# Patient Record
Sex: FEMALE | Race: WHITE | Employment: FULL TIME | URBAN - METROPOLITAN AREA
[De-identification: names, ages, dates, MRNs, and addresses within clinical notes are randomized per-mention and may not be internally consistent; named-entity substitution may affect disease eponyms.]

---

## 2021-05-26 ENCOUNTER — RECORDS - HEALTHEAST (OUTPATIENT)
Dept: ADMINISTRATIVE | Facility: CLINIC | Age: 61
End: 2021-05-26

## 2021-05-27 ENCOUNTER — RECORDS - HEALTHEAST (OUTPATIENT)
Dept: ADMINISTRATIVE | Facility: CLINIC | Age: 61
End: 2021-05-27

## 2021-05-31 ENCOUNTER — RECORDS - HEALTHEAST (OUTPATIENT)
Dept: ADMINISTRATIVE | Facility: CLINIC | Age: 61
End: 2021-05-31

## 2021-06-02 ENCOUNTER — RECORDS - HEALTHEAST (OUTPATIENT)
Dept: ADMINISTRATIVE | Facility: CLINIC | Age: 61
End: 2021-06-02

## 2021-06-17 ENCOUNTER — OFFICE VISIT - HEALTHEAST (OUTPATIENT)
Dept: FAMILY MEDICINE | Facility: CLINIC | Age: 61
End: 2021-06-17

## 2021-06-17 DIAGNOSIS — F41.9 ANXIETY: ICD-10-CM

## 2021-06-17 DIAGNOSIS — Z12.11 SCREEN FOR COLON CANCER: ICD-10-CM

## 2021-06-17 DIAGNOSIS — Z00.00 ROUTINE GENERAL MEDICAL EXAMINATION AT A HEALTH CARE FACILITY: ICD-10-CM

## 2021-06-17 DIAGNOSIS — R25.2 CRAMPING OF FEET: ICD-10-CM

## 2021-06-17 DIAGNOSIS — F51.04 PSYCHOPHYSIOLOGICAL INSOMNIA: ICD-10-CM

## 2021-06-17 DIAGNOSIS — Z12.4 SCREENING FOR MALIGNANT NEOPLASM OF CERVIX: ICD-10-CM

## 2021-06-17 DIAGNOSIS — Z12.31 ENCOUNTER FOR SCREENING MAMMOGRAM FOR BREAST CANCER: ICD-10-CM

## 2021-06-17 DIAGNOSIS — Z72.0 TOBACCO USE: ICD-10-CM

## 2021-06-17 DIAGNOSIS — Z13.220 LIPID SCREENING: ICD-10-CM

## 2021-06-17 DIAGNOSIS — Z11.59 ENCOUNTER FOR HEPATITIS C SCREENING TEST FOR LOW RISK PATIENT: ICD-10-CM

## 2021-06-17 LAB
ALBUMIN SERPL-MCNC: 4.2 G/DL (ref 3.5–5)
ALP SERPL-CCNC: 80 U/L (ref 45–120)
ALT SERPL W P-5'-P-CCNC: 11 U/L (ref 0–45)
ANION GAP SERPL CALCULATED.3IONS-SCNC: 12 MMOL/L (ref 5–18)
AST SERPL W P-5'-P-CCNC: 15 U/L (ref 0–40)
BILIRUB SERPL-MCNC: 0.4 MG/DL (ref 0–1)
BUN SERPL-MCNC: 13 MG/DL (ref 8–22)
CALCIUM SERPL-MCNC: 8.8 MG/DL (ref 8.5–10.5)
CHLORIDE BLD-SCNC: 105 MMOL/L (ref 98–107)
CHOLEST SERPL-MCNC: 238 MG/DL
CO2 SERPL-SCNC: 24 MMOL/L (ref 22–31)
CREAT SERPL-MCNC: 0.75 MG/DL (ref 0.6–1.1)
ERYTHROCYTE [DISTWIDTH] IN BLOOD BY AUTOMATED COUNT: 12.5 % (ref 11–14.5)
FASTING STATUS PATIENT QL REPORTED: YES
GFR SERPL CREATININE-BSD FRML MDRD: >60 ML/MIN/1.73M2
GLUCOSE BLD-MCNC: 90 MG/DL (ref 70–125)
HCT VFR BLD AUTO: 42.2 % (ref 35–47)
HDLC SERPL-MCNC: 72 MG/DL
HGB BLD-MCNC: 13.8 G/DL (ref 12–16)
LDLC SERPL CALC-MCNC: 149 MG/DL
MAGNESIUM SERPL-MCNC: 2 MG/DL (ref 1.8–2.6)
MCH RBC QN AUTO: 30.1 PG (ref 27–34)
MCHC RBC AUTO-ENTMCNC: 32.7 G/DL (ref 32–36)
MCV RBC AUTO: 92 FL (ref 80–100)
PLATELET # BLD AUTO: 218 THOU/UL (ref 140–440)
PMV BLD AUTO: 8.7 FL (ref 7–10)
POTASSIUM BLD-SCNC: 4.3 MMOL/L (ref 3.5–5)
PROT SERPL-MCNC: 7.2 G/DL (ref 6–8)
RBC # BLD AUTO: 4.58 MILL/UL (ref 3.8–5.4)
SODIUM SERPL-SCNC: 141 MMOL/L (ref 136–145)
TRIGL SERPL-MCNC: 86 MG/DL
WBC: 5 THOU/UL (ref 4–11)

## 2021-06-17 RX ORDER — HYDROXYZINE HYDROCHLORIDE 25 MG/1
25-50 TABLET, FILM COATED ORAL
Qty: 60 TABLET | Refills: 0 | Status: SHIPPED | OUTPATIENT
Start: 2021-06-17 | End: 2021-09-22

## 2021-06-17 ASSESSMENT — ANXIETY QUESTIONNAIRES
6. BECOMING EASILY ANNOYED OR IRRITABLE: NOT AT ALL
IF YOU CHECKED OFF ANY PROBLEMS ON THIS QUESTIONNAIRE, HOW DIFFICULT HAVE THESE PROBLEMS MADE IT FOR YOU TO DO YOUR WORK, TAKE CARE OF THINGS AT HOME, OR GET ALONG WITH OTHER PEOPLE: NOT DIFFICULT AT ALL
3. WORRYING TOO MUCH ABOUT DIFFERENT THINGS: NOT AT ALL
1. FEELING NERVOUS, ANXIOUS, OR ON EDGE: SEVERAL DAYS
5. BEING SO RESTLESS THAT IT IS HARD TO SIT STILL: NOT AT ALL
4. TROUBLE RELAXING: SEVERAL DAYS
GAD7 TOTAL SCORE: 2
2. NOT BEING ABLE TO STOP OR CONTROL WORRYING: NOT AT ALL
7. FEELING AFRAID AS IF SOMETHING AWFUL MIGHT HAPPEN: NOT AT ALL

## 2021-06-17 ASSESSMENT — PATIENT HEALTH QUESTIONNAIRE - PHQ9: SUM OF ALL RESPONSES TO PHQ QUESTIONS 1-9: 2

## 2021-06-17 ASSESSMENT — MIFFLIN-ST. JEOR: SCORE: 1220.22

## 2021-06-18 LAB
HCV AB SERPL QL IA: NEGATIVE
HPV SOURCE: NORMAL
HUMAN PAPILLOMA VIRUS 16 DNA: NEGATIVE
HUMAN PAPILLOMA VIRUS 18 DNA: NEGATIVE
HUMAN PAPILLOMA VIRUS FINAL DIAGNOSIS: NORMAL
HUMAN PAPILLOMA VIRUS OTHER HR: NEGATIVE
SPECIMEN DESCRIPTION: NORMAL

## 2021-06-21 ENCOUNTER — COMMUNICATION - HEALTHEAST (OUTPATIENT)
Dept: FAMILY MEDICINE | Facility: CLINIC | Age: 61
End: 2021-06-21

## 2021-06-25 ENCOUNTER — COMMUNICATION - HEALTHEAST (OUTPATIENT)
Dept: FAMILY MEDICINE | Facility: CLINIC | Age: 61
End: 2021-06-25

## 2021-06-26 NOTE — PATIENT INSTRUCTIONS - HE
Please call your insurance company to check on Shingrix vaccine coverage (the updated shingles vaccine)                     My Anxiety Action Plan  Name: Julia Garcia   Date of Birth 1960  Date: 6/17/2021    My Doctor: Kj Hemphill MD   My Clinic: [facility]  [address] [facility] [address]  [phone]          GREEN    ZONE   Good Control    What it looks like:     Things are going generally well. You have normal ups and downs. You may even feel depressed from time to time, but bad moods usually last less than a day.   What you need to do:  1. Continue to care for yourself (see self care plan)  2. Check your depression survival kit and update it as needed  3. Follow your physician s recommendations including any medication.  4. Do not stop taking medication unless you consult with your physician first.           YELLOW         ZONE Getting Worse    What it looks like:     Depression is starting to interfere with your life.     It may be hard to get out of bed; you may be starting to isolate yourself from others.    Symptoms of depression are starting to last most all day and this has happened for several days.     You may have suicidal thoughts but they are not constant.   What you need to do:     1. Call your care team. Your response to treatment will improve if you keep your care team informed of your progress. Yellow periods are signs an adjustment may need to be made.     2. Continue your self-care.  Just get dressed and ready for the day.  Don't give yourself time to talk yourself out of it.    3. Talk to someone in your support network.    4. Open up your depression Depression Self-Care Plan / Wellness kit.           RED    ZONE Medical Alert - Get Help    What it looks like:     Depression is seriously interfering with your life.     You may experience these or other symptoms: You can t get out of bed most days, can t work or engage in other  necessary activities, you have trouble taking care of basic hygiene, or basic responsibilities, thoughts of suicide or death that will not go away, self-injurious behavior.     What you need to do:  1. Call your care team and request a same-day appointment. If they are not available (weekends or after hours) call your local crisis line, emergency room or 911.          Depression Self-Care Plan / Wellness Kit    Many people find that medication and therapy are helpful treatments for managing depression. In addition, making small changes to your everyday life can help to boost your mood and improve your wellbeing. Below are some tips for you to consider. Be sure to talk with your medical provider and/or behavioral health consultant if your symptoms are worsening or not improving.     Sleep   Sleep hygiene  means all of the habits that support good, restful sleep. It includes maintaining a consistent bedtime and wake time, using your bedroom only for sleeping or sex, and keeping the bedroom dark and free of distractions like a computer, smartphone, or television.     Develop a Healthy Routine  Maintain good hygiene. Get out of bed in the morning, make your bed, brush your teeth, take a shower, and get dressed. Don t spend too much time viewing media that makes you feel stressed. Find time to relax each day.    Exercise  Get some form of exercise every day. This will help reduce pain and release endorphins, the  feel good  chemicals in your brain. It can be as simple as just going for a walk or doing some gardening, anything that will get you moving.      Diet  Strive to eat healthy foods, including fruits and vegetables. Drink plenty of water. Avoid excessive sugar, caffeine, alcohol, and other mood-altering substances.     Stay Connected with Others  Stay in touch with friends and family members.    Manage Your Mood  Try deep breathing, massage therapy, biofeedback, or meditation. Take part in fun activities when you  can. Try to find something to smile about each day.     Psychotherapy  Be open to working with a therapist if your provider recommends it.     Medication  Be sure to take your medication as prescribed. Most anti-depressants need to be taken every day. It usually takes several weeks for medications to work. Not all medicines work for all people. It is important to follow-up with your provider to make sure you have a treatment plan that is working for you. Do not stop your medication abruptly without first discussing it with your provider.    Crisis Resources   These hotlines are for both adults and children. They and are open 24 hours a day, 7 days a week unless noted otherwise.      National Suicide Prevention Lifeline   8-495-972-TALK (6131)      Crisis Text Line    www.crisistextline.org  Text HOME to 293019 from anywhere in the [place], anytime, about any type of crisis. A live, trained crisis counselor will receive the text and respond quickly.      [facility] Lifeline for LGBTQ Youth  A national crisis intervention and suicide lifeline for LGBTQ youth under 25. Provides a safe place to talk without judgement. Call 1-422.536.4134; text START to 963850 or visit www.the"[employer]vorproject.org to talk to a trained counselor.      For [facility] crisis numbers, visit the [facility] website at:  https://[place].gov/dhs/people-we-serve/adults/health-care/mental-health/resources/crisis-contacts.jsp

## 2021-06-26 NOTE — PROGRESS NOTES
Assessment/ Plan     1. Routine general medical examination at a health care facility  Reviewed and updated patient's past medical, surgical, family, social history, along with current medications and allergies.  Reviewed general healthy living lifestyle guidelines.  Updated patient's health maintenance as further outlined below.  - HM2(CBC w/o Differential)  - Comprehensive Metabolic Panel    2. Tobacco use  Picked up smoking approximately 2 years ago, current use about half a pack per day.  Reiterated the importance of smoking cessation, particularly due to her mother's history of lung cancer.  She declines smoking cessation resources today but is agreeable to the pneumonia vaccine.  - Pneumococcal polysaccharide vaccine 23-valent 3 yo or older, subq/IM    3. Lipid screening  - Lipid [place] FASTING    4. Encounter for hepatitis C screening test for low risk patient  - Hepatitis C Antibody (Anti-HCV)    5. Screening for malignant neoplasm of cervix  - Gynecologic Cytology (PAP Smear)    6. Encounter for screening mammogram for breast cancer  - Mammo Screening Bilateral; Future    7. Screen for colon cancer  - Ambulatory referral for Colonoscopy    8. Anxiety  9. Psychophysiological insomnia  Worsening situational anxiety and difficulty sleeping at night, with low PHQ-9 and DAVID-7 scores of 2.  She declined need for psychotherapy and is unsure if she wants to begin a daily medication.  Will begin with hydroxyzine as needed for sleep and during high stress situations.  - hydrOXYzine HCL (ATARAX) 25 MG tablet; Take 1-2 tablets (25-50 mg total) by mouth at bedtime as needed.  Dispense: 60 tablet; Refill: 0    10. Cramping of feet  Recent episodes of nocturnal foot cramps.  Will check electrolytes today.   - Magnesium    Laurita Chambers,     Subjective:     Julia Garcia is a 60 y.o. female who presents for an annual exam.      Other concerns addressed:  Increased situational anxiety, particularly since her 2  children have moved out of the house.  She now lives alone.  Last week she went to a wedding and had a lot of stress regarding going.  She did not particularly enjoy herself and would not a lot of her sons were there.  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  She also has trouble sleeping at night and has been taking Advil PM.  She denies SI/HI.  She does not want to partake in psychotherapy.     She also had a few nights of foot cramps that wake her up from sleep.  She is taking a multivitamin.     Healthy Habits:   Healthy Diet: Yes  Regular Exercise: No  Seat Belt: Yes  Dental Visits Regularly: has dentures  ------------------  Last Colonoscopy (50-76yo): no prior  Last Dexa (64 yo+): n/a  Prevention of Osteoporosis (Ca 1200 mg/d, Vit D 1000 IU): Yes  Last annual blood work and any abnormalities?: low potassium 2016  Low dose CT (30 pack year, 55-76yo q1y): n/a    Gynecologic History  No LMP recorded. Patient has had an ablation. early 2000s  Last Pap (21-66yo): 2013. Results were: normal  Last mammogram (40-76yo): 2013. Results were: normal    Immunization History   Administered Date(s) Administered     COVID-19,PF,Moderna 12/30/2020, 01/27/2021     Pneumo Polysac 23-V 06/17/2021     Td,adult,historic,unspecified 04/24/2007     Tdap 09/23/2013     Immunization status: due today.     Health Maintenance   Topic Date Due     HEPATITIS C SCREENING  Never done     HIV SCREENING  Never done     ADVANCE CARE PLANNING  Never done     COLORECTAL CANCER SCREENING  Never done     LIPID  Never done     ZOSTER VACCINES (1 of 2) Never done     MAMMOGRAM  09/03/2015     PAP SMEAR  09/23/2018     HPV TEST  09/23/2018     INFLUENZA VACCINE RULE BASED (Season Ended) 08/01/2021     PREVENTIVE CARE VISIT  06/17/2022     TD 18+ HE  09/23/2023     Pneumococcal Vaccine: Pediatrics (0 to 5 Years) and At-Risk Patients (6 to 64 Years) (2 of 2) 11/04/2027     TDAP ADULT ONE TIME DOSE   Completed     COVID-19 Vaccine  Completed     HEPATITIS B VACCINES  Aged Out       OB History   No obstetric history on file.       Current Outpatient Medications   Medication Sig Dispense Refill     ibuprofen/diphenhydramine cit (ADVIL PM ORAL) Take by mouth.       multivit,iron,minerals/lutein (CENTRUM SILVER ULTRA WOMEN'S ORAL) Take by mouth.       hydrOXYzine HCL (ATARAX) 25 MG tablet Take 1-2 tablets (25-50 mg total) by mouth at bedtime as needed. 60 tablet 0     No current facility-administered medications for this visit.      No past medical history on file.  No past surgical history on file.  Sulfa (sulfonamide antibiotics)  Family History   Problem Relation Age of Onset     Lung cancer Mother      Hypertension Father      Liver cancer Father      Diabetes Maternal Grandmother      Heart disease Paternal Grandfather      Dialysis Paternal Grandfather      Social History     Socioeconomic History     Marital status: Single     Spouse name: Not on file     Number of children: Not on file     Years of education: Not on file     Highest education level: Not on file   Occupational History     Not on file   Social Needs     Financial resource strain: Not on file     Food insecurity     Worry: Not on file     Inability: Not on file     Transportation needs     Medical: Not on file     Non-medical: Not on file   Tobacco Use     Smoking status: Current Every Day Smoker     Packs/day: 0.50     Years: 2.00     Pack years: 1.00     Types: Cigarettes   Substance and Sexual Activity     Alcohol use: Yes     Drug use: Not Currently     Sexual activity: Not on file   Lifestyle     Physical activity     Days per week: Not on file     Minutes per session: Not on file     Stress: Not on file   Relationships     Social connections     Talks on phone: Not on file     Gets together: Not on file     Attends Denominational service: Not on file     Active member of club or organization: Not on file     Attends meetings of clubs or  "organizations: Not on file     Relationship status: Not on file     Intimate partner violence     Fear of current or ex partner: Not on file     Emotionally abused: Not on file     Physically abused: Not on file     Forced sexual activity: Not on file   Other Topics Concern     Not on file   Social History Narrative    Works as LPN. Has two boys.        Review of Systems  12 point ROS positive for what is indicated in HPI, otherwise negative.      Objective:      Physical Exam:  /82   Pulse 90   Temp 98.7  F (37.1  C) (Oral)   Resp 12   Ht 5' 3.5\" (1.613 m)   Wt 146 lb 3.2 oz (66.3 kg)   SpO2 98%   BMI 25.49 kg/m      General appearance: Alert, cooperative, no distress, appears stated age  Head: Normocephalic, atraumatic, without obvious abnormality  EARS: Tm's gray dull with structures seen bilaterally  Eyes: Pupils equal round, reactive.  Conjunctiva clear.  Nose: Nares normal, no drainage.  Throat: Lips, mucosa, tongue normal mucosa pink and moist  Neck: Supple, symmetric, trachea midline, no adenopathy.  No thyroid enlargement, tenderness or nodules.    Back: Symmetric  Lungs: Clear to auscultation bilaterally, no wheezing or crackles present.  Respirations unlabored  Heart: Regular rate and rhythm, normal S1 and S2, no murmur, rub or gallop.  Abdomen: Soft, nontender, nondistended. No masses or organomegaly.  Extremities: Extremities normal, atraumatic.  No cyanosis or edema.  Skin: Skin color, texture, turgor normal no rashes or lesions on limited skin exam  Neurologic: CN II through XII intact, normal strength.  GYN: Vulvar atrophy. Vagina with normal rugae, no notable discharge. Cervix is normal in appearance without lesions or discharge noted, no cervical motion tenderness.   Psych: mood neutral and affect appropriate    Results for orders placed or performed during the hospital encounter of 11/21/16   Urine culture    Specimen: Urine   Result Value Ref Range    Culture       Mixture of " organism types with no predominating organism.   HM2(CBC w/o Differential)   Result Value Ref Range    WBC 8.3 4.0 - 11.0 thou/uL    RBC 4.22 3.80 - 5.40 mill/uL    Hemoglobin 13.0 12.0 - 16.0 g/dL    Hematocrit 38.5 35.0 - 47.0 %    MCV 91 80 - 100 fL    MCH 30.7 27.0 - 34.0 pg    MCHC 33.6 32.0 - 36.0 g/dL    RDW 13.2 11.0 - 14.5 %    Platelets 205 140 - 440 thou/uL    MPV 7.6 7.0 - 10.0 fL   Basic Metabolic Panel   Result Value Ref Range    Sodium 141 136 - 145 mmol/L    Potassium 3.4 (L) 3.5 - 5.0 mmol/L    Chloride 109 (H) 98 - 107 mmol/L    CO2 24 22 - 31 mmol/L    Anion Gap, Calculation 8 5 - 18 mmol/L    Glucose 114 70 - 125 mg/dL    Calcium 8.5 8.5 - 10.5 mg/dL    BUN 19 8 - 22 mg/dL    Creatinine 0.79 0.60 - 1.10 mg/dL    GFR MDRD Af Amer >60 >60 mL/min/1.73m2    GFR MDRD Non Af Amer >60 >60 mL/min/1.73m2   Urinalysis   Result Value Ref Range    Color, UA Yellow Colorless, Yellow, Straw, Light Yellow    Clarity, UA Turbid (!) Clear    Glucose, UA Negative Negative    Bilirubin, UA Negative Negative    Ketones, UA Negative Negative, 60 mg/dL    Specific Gravity, UA 1.010 1.001 - 1.030    Blood, UA Negative Negative    pH, UA 7.0 4.5 - 8.0    Protein, UA Trace (!) Negative mg/dL    Urobilinogen, UA 4.0 E.U./dL (!) <2.0 E.U./dL, 2.0 E.U./dL    Nitrite, UA Negative Negative    Leukocytes, UA Moderate (!) Negative    Bacteria, UA Moderate (!) None Seen hpf    RBC, UA 0-2 None Seen, 0-2 hpf    WBC, UA 10-25 (!) None Seen, 0-5 hpf    Squam Epithel, UA 0-5 None Seen, 0-5 lpf    WBC Clumps Present (!) None Seen    Amorphous, UA Moderate (!) None Seen    Mucus, UA Moderate (!) None Seen lpf    Granular Casts, UA 5-10 (!) None Seen lpf    Hyaline Casts, UA 10-25 (!) 0-5, None Seen lpf   ECG 12 lead nursing unit performed   Result Value Ref Range    SYSTOLIC BLOOD PRESSURE  mmHg    DIASTOLIC BLOOD PRESSURE  mmHg    VENTRICULAR RATE 80 BPM    ATRIAL RATE 80 BPM    P-R INTERVAL 160 ms    QRS DURATION 86 ms    Q-T  INTERVAL 380 ms    QTC CALCULATION (BEZET) 438 ms    P Axis 71 degrees    R AXIS 43 degrees    T AXIS 48 degrees    MUSE DIAGNOSIS       Normal sinus rhythm  Normal ECG  When compared with ECG of 30-MAR-2010 08:46,  No significant change was found  Confirmed by BRIDGETTE GIBBS MD LOC:JN ([id number]) on 11/21/2016 1:46:12 PM         Laurita Chambers DO

## 2021-07-04 NOTE — LETTER
Letter by Laurita Chambers DO at      Author: Laurita Chambers DO Service: -- Author Type: --    Filed:  Encounter Date: 6/21/2021 Status: (Other)         Julia Jacobonathananne  [address] [facility] [address]             June 21, 2021         Dear Ms. Petitthaiscatherinerogers,    Below are the results from your recent visit:    Resulted Orders   Lipid [place] FASTING   Result Value Ref Range    Cholesterol 238 (H) <=199 mg/dL    Triglycerides 86 <=149 mg/dL    HDL Cholesterol 72 >=50 mg/dL    LDL Calculated 149 (H) <=129 mg/dL    Patient Fasting > 8hrs? Yes    HM2(CBC w/o Differential)   Result Value Ref Range    WBC 5.0 4.0 - 11.0 thou/uL    RBC 4.58 3.80 - 5.40 mill/uL    Hemoglobin 13.8 12.0 - 16.0 g/dL    Hematocrit 42.2 35.0 - 47.0 %    MCV 92 80 - 100 fL    MCH 30.1 27.0 - 34.0 pg    MCHC 32.7 32.0 - 36.0 g/dL    RDW 12.5 11.0 - 14.5 %    Platelets 218 140 - 440 thou/uL    MPV 8.7 7.0 - 10.0 fL   Hepatitis C Antibody (Anti-HCV)   Result Value Ref Range    Hepatitis C Ab Negative Negative   Magnesium   Result Value Ref Range    Magnesium 2.0 1.8 - 2.6 mg/dL   Comprehensive Metabolic Panel   Result Value Ref Range    Sodium 141 136 - 145 mmol/L    Potassium 4.3 3.5 - 5.0 mmol/L    Chloride 105 98 - 107 mmol/L    CO2 24 22 - 31 mmol/L    Anion Gap, Calculation 12 5 - 18 mmol/L    Glucose 90 70 - 125 mg/dL    BUN 13 8 - 22 mg/dL    Creatinine 0.75 0.60 - 1.10 mg/dL    GFR MDRD Af Amer >60 >60 mL/min/1.73m2    GFR MDRD Non Af Amer >60 >60 mL/min/1.73m2    Bilirubin, Total 0.4 0.0 - 1.0 mg/dL    Calcium 8.8 8.5 - 10.5 mg/dL    Protein, Total 7.2 6.0 - 8.0 g/dL    Albumin 4.2 3.5 - 5.0 g/dL    Alkaline Phosphatase 80 45 - 120 U/L    AST 15 0 - 40 U/L    ALT 11 0 - 45 U/L    Narrative    Fasting Glucose reference range is 70-99 mg/dL per  American Diabetes Association (ADA) guidelines.       1) your blood counts, kidney and liver function, and electrolytes are normal. I do not see an obvious reason for your foot  cramps based on your blood work. Would recommend stretching and a warm shower prior to bed to loosen the muscles.   2) your hepatitis C screen was negative   3) your cholesterol levels are elevated. You were just near the threshold for recommending a medication to lower these values. Please work on a healthy diet and exercise. We will reassess this in 1 year.     Laurita Chambers, DO     Please call with questions or contact us using [facility].    Sincerely,        Electronically signed by Laurita Chambers DO

## 2021-07-04 NOTE — LETTER
Letter by Antonia Sutton RN at      Author: Antonia Sutton RN Service: -- Author Type: --    Filed:  Encounter Date: 6/25/2021 Status: (Other)         Julia Garcia  [address] [facility] [address]             June 25, 2021         Dear Ms. Garcia,    We are happy to inform you that your recent Pap smear and Human Papillomavirus (HPV) test results are normal and negative.    It is recommended that you have your next Pap smear and Human Papillomavirus (HPV) test in 5 years. You will also need to return to the clinic every year for an annual wellness visit.    If you have additional questions regarding this result, please contact our office and we will be happy to assist you.      Sincerely,    Your [facility] Care Team

## 2021-07-05 PROBLEM — F41.9 ANXIETY: Status: ACTIVE | Noted: 2021-06-17

## 2021-07-06 VITALS
OXYGEN SATURATION: 98 % | WEIGHT: 146.2 LBS | RESPIRATION RATE: 12 BRPM | TEMPERATURE: 98.7 F | HEART RATE: 90 BPM | SYSTOLIC BLOOD PRESSURE: 135 MMHG | HEIGHT: 64 IN | DIASTOLIC BLOOD PRESSURE: 82 MMHG | BODY MASS INDEX: 24.96 KG/M2

## 2021-07-06 ASSESSMENT — PATIENT HEALTH QUESTIONNAIRE - PHQ9: SUM OF ALL RESPONSES TO PHQ QUESTIONS 1-9: 2

## 2021-07-08 ASSESSMENT — ANXIETY QUESTIONNAIRES: GAD7 TOTAL SCORE: 2

## 2021-07-13 ENCOUNTER — RECORDS - HEALTHEAST (OUTPATIENT)
Dept: ADMINISTRATIVE | Facility: CLINIC | Age: 61
End: 2021-07-13

## 2021-07-21 ENCOUNTER — RECORDS - HEALTHEAST (OUTPATIENT)
Dept: ADMINISTRATIVE | Facility: CLINIC | Age: 61
End: 2021-07-21

## 2021-09-21 DIAGNOSIS — F51.04 PSYCHOPHYSIOLOGICAL INSOMNIA: ICD-10-CM

## 2021-09-21 NOTE — TELEPHONE ENCOUNTER
"Routing refill request to provider for review/approval because:  PCP to review - PRN medication.    Last Written Prescription Date:  6/17/2021  Last Fill Quantity: 60,  # refills: 0   Last office visit provider:  6/17/2021 Dr. Chambers     hydrOXYzine HCL (ATARAX) 25 MG tablet 60 tablet 0 6/17/2021  --   Sig - Route: Take 1-2 tablets (25-50 mg total) by mouth at bedtime as needed. - Oral   Sent to pharmacy as: hydrOXYzine HCL 25 mg tablet (ATARAX)   E-Prescribing Status: Receipt confirmed by pharmacy (6/17/2021 12:31 PM CDT       Requested Prescriptions   Pending Prescriptions Disp Refills     hydrOXYzine (ATARAX) 25 MG tablet [Pharmacy Med Name: HYDROXYZINE HCL 25MG TABS (WHITE)] 60 tablet 0     Sig: TAKE 1 TO 2 TABLETS(25 TO 50 MG) BY MOUTH AT BEDTIME AS NEEDED       Antihistamines Protocol Passed - 9/21/2021  9:15 AM        Passed - Recent (12 mo) or future (30 days) visit within the authorizing provider's specialty     Patient has had an office visit with the authorizing provider or a provider within the authorizing providers department within the previous 12 mos or has a future within next 30 days. See \"Patient Info\" tab in inbasket, or \"Choose Columns\" in Meds & Orders section of the refill encounter.              Passed - Patient is age 3 or older     Apply age and/or weight-based dosing for peds patients age 3 and older.    Forward request to provider for patients under the age of 3.          Passed - Medication is active on med list             Pallavi Palmer RN 09/21/21 3:52 PM  "

## 2021-09-22 RX ORDER — HYDROXYZINE HYDROCHLORIDE 25 MG/1
TABLET, FILM COATED ORAL
Qty: 60 TABLET | Refills: 0 | Status: SHIPPED | OUTPATIENT
Start: 2021-09-22 | End: 2022-04-14

## 2021-11-12 ENCOUNTER — ANCILLARY PROCEDURE (OUTPATIENT)
Dept: MAMMOGRAPHY | Facility: CLINIC | Age: 61
End: 2021-11-12
Attending: FAMILY MEDICINE
Payer: COMMERCIAL

## 2021-11-12 DIAGNOSIS — Z12.31 SCREENING MAMMOGRAM, ENCOUNTER FOR: ICD-10-CM

## 2021-11-12 PROCEDURE — 77067 SCR MAMMO BI INCL CAD: CPT

## 2022-04-14 ENCOUNTER — OFFICE VISIT (OUTPATIENT)
Dept: FAMILY MEDICINE | Facility: CLINIC | Age: 62
End: 2022-04-14
Payer: COMMERCIAL

## 2022-04-14 VITALS
DIASTOLIC BLOOD PRESSURE: 101 MMHG | BODY MASS INDEX: 28.56 KG/M2 | TEMPERATURE: 98.2 F | WEIGHT: 163.8 LBS | OXYGEN SATURATION: 98 % | HEART RATE: 79 BPM | RESPIRATION RATE: 16 BRPM | SYSTOLIC BLOOD PRESSURE: 161 MMHG

## 2022-04-14 DIAGNOSIS — R03.0 ELEVATED BP WITHOUT DIAGNOSIS OF HYPERTENSION: ICD-10-CM

## 2022-04-14 DIAGNOSIS — F51.04 PSYCHOPHYSIOLOGICAL INSOMNIA: ICD-10-CM

## 2022-04-14 DIAGNOSIS — Z12.11 ENCOUNTER FOR COLORECTAL CANCER SCREENING: ICD-10-CM

## 2022-04-14 DIAGNOSIS — Z12.12 ENCOUNTER FOR COLORECTAL CANCER SCREENING: ICD-10-CM

## 2022-04-14 DIAGNOSIS — R39.15 URINARY URGENCY: ICD-10-CM

## 2022-04-14 DIAGNOSIS — R35.0 INCREASED FREQUENCY OF URINATION: ICD-10-CM

## 2022-04-14 LAB
ALBUMIN UR-MCNC: ABNORMAL MG/DL
APPEARANCE UR: CLEAR
BACTERIA #/AREA URNS HPF: ABNORMAL /HPF
BILIRUB UR QL STRIP: ABNORMAL
COLOR UR AUTO: YELLOW
GLUCOSE UR STRIP-MCNC: NEGATIVE MG/DL
HGB UR QL STRIP: ABNORMAL
KETONES UR STRIP-MCNC: ABNORMAL MG/DL
LEUKOCYTE ESTERASE UR QL STRIP: NEGATIVE
NITRATE UR QL: NEGATIVE
PH UR STRIP: 5 [PH] (ref 5–8)
RBC #/AREA URNS AUTO: ABNORMAL /HPF
SP GR UR STRIP: >=1.03 (ref 1–1.03)
SQUAMOUS #/AREA URNS AUTO: ABNORMAL /LPF
UROBILINOGEN UR STRIP-ACNC: 0.2 E.U./DL
WBC #/AREA URNS AUTO: ABNORMAL /HPF

## 2022-04-14 PROCEDURE — 99214 OFFICE O/P EST MOD 30 MIN: CPT | Performed by: FAMILY MEDICINE

## 2022-04-14 PROCEDURE — 81001 URINALYSIS AUTO W/SCOPE: CPT | Performed by: FAMILY MEDICINE

## 2022-04-14 PROCEDURE — 87086 URINE CULTURE/COLONY COUNT: CPT | Performed by: FAMILY MEDICINE

## 2022-04-14 RX ORDER — HYDROXYZINE HYDROCHLORIDE 25 MG/1
TABLET, FILM COATED ORAL
Qty: 60 TABLET | Refills: 0 | Status: SHIPPED | OUTPATIENT
Start: 2022-04-14

## 2022-04-14 ASSESSMENT — ANXIETY QUESTIONNAIRES
5. BEING SO RESTLESS THAT IT IS HARD TO SIT STILL: NOT AT ALL
7. FEELING AFRAID AS IF SOMETHING AWFUL MIGHT HAPPEN: NOT AT ALL
7. FEELING AFRAID AS IF SOMETHING AWFUL MIGHT HAPPEN: NOT AT ALL
3. WORRYING TOO MUCH ABOUT DIFFERENT THINGS: NOT AT ALL
GAD7 TOTAL SCORE: 0
6. BECOMING EASILY ANNOYED OR IRRITABLE: NOT AT ALL
1. FEELING NERVOUS, ANXIOUS, OR ON EDGE: NOT AT ALL
GAD7 TOTAL SCORE: 0
2. NOT BEING ABLE TO STOP OR CONTROL WORRYING: NOT AT ALL
4. TROUBLE RELAXING: NOT AT ALL

## 2022-04-14 NOTE — PROGRESS NOTES
DIAGNOSIS:  Encounter Diagnoses   Name Primary?     Increased frequency of urination, unclear etiology, see Urology if culture negative      Urinary urgency, unclear etiology, see Urology if culture negative      Psychophysiological insomnia,  Hydroxyzine refilled      Elevated BP without diagnosis of hypertension      Encounter for colorectal cancer screening, Cologuard ordered         PLAN:     Recheck home/work BPs and get a nurse BP check in one week    Pending urine culture.  If negative culture then would recommend Urology consultation.    Complete a Cologuard test    [facility] referral     Hydroxyzine aids in sleep and is refilled.        HPI:   onset one month ago of some itching followed by frequency, urgency but no burning.  Twice in the past two weeks had feeling of warmth.  No shaking chills.  Some lower back pain last weekend at work.  No h/o UTI in the the recent past (or probably every).   Fingerstick bs at work today was 94.  Normal pap with neg HPV ON 6-17-22  Colonoscopy was never scheduled.  Hydroxyzine aids in sleep and is refilled.      Current Outpatient Medications   Medication     hydrOXYzine (ATARAX) 25 MG tablet     ibuprofen/diphenhydramine cit (ADVIL PM ORAL)     multivit,iron,minerals/lutein (CENTRUM SILVER ULTRA WOMEN'S ORAL)     No current facility-administered medications for this visit.       Pmh: reviewed  Psh: reviewed  Allergy:  reviewed      EXAM:    BP (!) 161/101   Pulse 79   Temp 98.2  F (36.8  C) (Oral)   Resp 16   Wt 74.3 kg (163 lb 12.8 oz)   SpO2 98%   BMI 28.56 kg/m    GEN:   ALERT, NAD, ORIENTED TIMES THREE  LUNGS: CTA  COR: RRR WITHOUT MURMUR  ABD: SOFT, +BS, NONTX WITHOUT GUARDING OR PERITONEAL SIGNS  Slight suprapubic tx (feeling of fullness)    Last Comprehensive Metabolic Panel:  Sodium   Date Value Ref Range Status   06/17/2021 141 136 - 145 mmol/L Final     Potassium   Date Value Ref Range Status   06/17/2021 4.3 3.5 - 5.0 mmol/L Final     Chloride   Date  Value Ref Range Status   06/17/2021 105 98 - 107 mmol/L Final     Carbon Dioxide (CO2)   Date Value Ref Range Status   06/17/2021 24 22 - 31 mmol/L Final     Anion Gap   Date Value Ref Range Status   06/17/2021 12 5 - 18 mmol/L Final     Glucose   Date Value Ref Range Status   06/17/2021 90 70 - 125 mg/dL Final     Urea Nitrogen   Date Value Ref Range Status   06/17/2021 13 8 - 22 mg/dL Final     Creatinine   Date Value Ref Range Status   06/17/2021 0.75 0.60 - 1.10 mg/dL Final     GFR Estimate   Date Value Ref Range Status   06/17/2021 >60 >60 mL/min/1.73m2 Final     Calcium   Date Value Ref Range Status   06/17/2021 8.8 8.5 - 10.5 mg/dL Final     Bilirubin Total   Date Value Ref Range Status   06/17/2021 0.4 0.0 - 1.0 mg/dL Final     Alkaline Phosphatase   Date Value Ref Range Status   06/17/2021 80 45 - 120 U/L Final     ALT   Date Value Ref Range Status   06/17/2021 11 0 - 45 U/L Final     AST   Date Value Ref Range Status   06/17/2021 15 0 - 40 U/L Final                   Answers for HPI/ROS submitted by the patient on 4/14/2022  DAVID 7 TOTAL SCORE: 0  How many servings of fruits and vegetables do you eat daily?: 0-1  On average, how many sweetened beverages do you drink each day (Examples: soda, juice, sweet tea, etc.  Do NOT count diet or artificially sweetened beverages)?: 1  How many minutes a day do you exercise enough to make your heart beat faster?: 9 or less  How many days a week do you exercise enough to make your heart beat faster?: 3 or less  How many days per week do you miss taking your medication?: 0  What is the reason for your visit today?: Possible uti  When did your symptoms begin?: 3-4 weeks ago  What are your symptoms?: Frequency. Urinary.  Voiding small amts  How would you describe these symptoms?: Severe  Are your symptoms:: Staying the same  Have you had these symptoms before?: No  Is there anything that makes you feel worse?: No

## 2022-04-14 NOTE — PATIENT INSTRUCTIONS
Recheck home/work BPs and get a nurse BP check in one week    Pending urine culture    Complete a Cologuard test    [facility] referral

## 2022-04-14 NOTE — LETTER
April 16, 2022      Julia Garcia  [address]  [facility] [address]        Dear MsAnju,  We are writing to inform you of your test results.    Alexsander Dumont:  Your urine culture was NEGATIVE for infection.  I would like you to follow up with Urology for your ongoing symptoms.    Resulted Orders   UA Macro with Reflex to Micro and Culture - lab collect   Result Value Ref Range    Color Urine Yellow Colorless, Straw, Light Yellow, Yellow    Appearance Urine Clear Clear    Glucose Urine Negative Negative mg/dL    Bilirubin Urine Small (A) Negative    Ketones Urine Trace (A) Negative mg/dL    Specific Gravity Urine >=1.030 1.005 - 1.030    Blood Urine Trace (A) Negative    pH Urine 5.0 5.0 - 8.0    Protein Albumin Urine Trace (A) Negative mg/dL    Urobilinogen Urine 0.2 0.2, 1.0 E.U./dL    Nitrite Urine Negative Negative    Leukocyte Esterase Urine Negative Negative   Urine Culture Aerobic Bacterial - lab collect   Result Value Ref Range    Culture No Growth    Urine Microscopic   Result Value Ref Range    Bacteria Urine None Seen None Seen /HPF    RBC Urine 0-2 0-2 /HPF /HPF    WBC Urine 0-5 0-5 /HPF /HPF    Squamous Epithelials Urine Few (A) None Seen /LPF    Narrative    Urine Culture not indicated       If you have any questions or concerns, please call the clinic at the number listed above.       Sincerely,      Johnathan Sorenson MD

## 2022-04-15 ASSESSMENT — ANXIETY QUESTIONNAIRES: GAD7 TOTAL SCORE: 0

## 2022-04-16 LAB — BACTERIA UR CULT: NO GROWTH

## 2022-04-19 ENCOUNTER — TELEPHONE (OUTPATIENT)
Dept: FAMILY MEDICINE | Facility: CLINIC | Age: 62
End: 2022-04-19
Payer: COMMERCIAL

## 2022-04-19 NOTE — TELEPHONE ENCOUNTER
----- Message from Johnathan Sorenson MD sent at 4/16/2022  5:53 PM CDT -----  Alexsander Dumont:  Your urine culture was NEGATIVE for infection.  I would like you to follow up with Urology for your ongoing symptoms.

## 2022-04-19 NOTE — TELEPHONE ENCOUNTER
Left message to call back for: Results  Information to relay to patient: LMTCB, Please see message below from provider.

## 2022-11-30 ENCOUNTER — ANCILLARY PROCEDURE (OUTPATIENT)
Dept: MAMMOGRAPHY | Facility: HOSPITAL | Age: 62
End: 2022-11-30
Attending: FAMILY MEDICINE
Payer: COMMERCIAL

## 2022-11-30 DIAGNOSIS — Z12.31 VISIT FOR SCREENING MAMMOGRAM: ICD-10-CM

## 2022-11-30 PROCEDURE — 77067 SCR MAMMO BI INCL CAD: CPT

## 2022-12-15 ENCOUNTER — ANCILLARY PROCEDURE (OUTPATIENT)
Dept: MAMMOGRAPHY | Facility: CLINIC | Age: 62
End: 2022-12-15
Attending: FAMILY MEDICINE
Payer: COMMERCIAL

## 2022-12-15 DIAGNOSIS — N64.89 BREAST ASYMMETRY: ICD-10-CM

## 2022-12-15 PROCEDURE — 77061 BREAST TOMOSYNTHESIS UNI: CPT | Mod: RT

## 2024-01-19 ENCOUNTER — ANCILLARY PROCEDURE (OUTPATIENT)
Dept: MAMMOGRAPHY | Facility: HOSPITAL | Age: 64
End: 2024-01-19
Attending: FAMILY MEDICINE
Payer: COMMERCIAL

## 2024-01-19 DIAGNOSIS — Z12.31 VISIT FOR SCREENING MAMMOGRAM: ICD-10-CM

## 2024-01-19 PROCEDURE — 77067 SCR MAMMO BI INCL CAD: CPT

## 2025-02-28 ENCOUNTER — ANCILLARY PROCEDURE (OUTPATIENT)
Dept: MAMMOGRAPHY | Facility: HOSPITAL | Age: 65
End: 2025-02-28
Attending: FAMILY MEDICINE
Payer: COMMERCIAL

## 2025-02-28 DIAGNOSIS — Z12.31 VISIT FOR SCREENING MAMMOGRAM: ICD-10-CM

## 2025-02-28 PROCEDURE — 77063 BREAST TOMOSYNTHESIS BI: CPT

## 2025-02-28 PROCEDURE — 77067 SCR MAMMO BI INCL CAD: CPT

## 2025-04-01 SDOH — HEALTH STABILITY: PHYSICAL HEALTH: ON AVERAGE, HOW MANY MINUTES DO YOU ENGAGE IN EXERCISE AT THIS LEVEL?: 10 MIN

## 2025-04-01 SDOH — HEALTH STABILITY: PHYSICAL HEALTH: ON AVERAGE, HOW MANY DAYS PER WEEK DO YOU ENGAGE IN MODERATE TO STRENUOUS EXERCISE (LIKE A BRISK WALK)?: 1 DAY

## 2025-04-02 ENCOUNTER — OFFICE VISIT (OUTPATIENT)
Dept: FAMILY MEDICINE | Facility: CLINIC | Age: 65
End: 2025-04-02
Payer: COMMERCIAL

## 2025-04-02 VITALS
BODY MASS INDEX: 23.52 KG/M2 | SYSTOLIC BLOOD PRESSURE: 137 MMHG | RESPIRATION RATE: 16 BRPM | DIASTOLIC BLOOD PRESSURE: 86 MMHG | HEIGHT: 62 IN | OXYGEN SATURATION: 97 % | TEMPERATURE: 98.7 F | HEART RATE: 69 BPM | WEIGHT: 127.8 LBS

## 2025-04-02 DIAGNOSIS — Z12.11 SCREEN FOR COLON CANCER: ICD-10-CM

## 2025-04-02 DIAGNOSIS — F41.9 ANXIETY: ICD-10-CM

## 2025-04-02 DIAGNOSIS — R25.2 NOCTURNAL FOOT CRAMPS: ICD-10-CM

## 2025-04-02 DIAGNOSIS — E78.2 MIXED HYPERLIPIDEMIA: ICD-10-CM

## 2025-04-02 DIAGNOSIS — F51.04 PSYCHOPHYSIOLOGICAL INSOMNIA: ICD-10-CM

## 2025-04-02 DIAGNOSIS — Z87.891 HISTORY OF TOBACCO USE, PRESENTING HAZARDS TO HEALTH: ICD-10-CM

## 2025-04-02 DIAGNOSIS — Z00.00 ROUTINE GENERAL MEDICAL EXAMINATION AT A HEALTH CARE FACILITY: Primary | ICD-10-CM

## 2025-04-02 LAB
ALBUMIN SERPL BCG-MCNC: 4.3 G/DL (ref 3.5–5.2)
ALP SERPL-CCNC: 77 U/L (ref 40–150)
ALT SERPL W P-5'-P-CCNC: 13 U/L (ref 0–50)
ANION GAP SERPL CALCULATED.3IONS-SCNC: 10 MMOL/L (ref 7–15)
AST SERPL W P-5'-P-CCNC: 21 U/L (ref 0–45)
BILIRUB SERPL-MCNC: 0.2 MG/DL
BUN SERPL-MCNC: 17.8 MG/DL (ref 8–23)
CALCIUM SERPL-MCNC: 9.1 MG/DL (ref 8.8–10.4)
CHLORIDE SERPL-SCNC: 100 MMOL/L (ref 98–107)
CHOLEST SERPL-MCNC: 180 MG/DL
CREAT SERPL-MCNC: 0.73 MG/DL (ref 0.51–0.95)
EGFRCR SERPLBLD CKD-EPI 2021: >90 ML/MIN/1.73M2
ERYTHROCYTE [DISTWIDTH] IN BLOOD BY AUTOMATED COUNT: 12.3 % (ref 10–15)
FASTING STATUS PATIENT QL REPORTED: NO
FASTING STATUS PATIENT QL REPORTED: NO
FERRITIN SERPL-MCNC: 54 NG/ML (ref 11–328)
GLUCOSE SERPL-MCNC: 85 MG/DL (ref 70–99)
HCO3 SERPL-SCNC: 28 MMOL/L (ref 22–29)
HCT VFR BLD AUTO: 39 % (ref 35–47)
HDLC SERPL-MCNC: 67 MG/DL
HGB BLD-MCNC: 12.9 G/DL (ref 11.7–15.7)
LDLC SERPL CALC-MCNC: 96 MG/DL
MAGNESIUM SERPL-MCNC: 2.2 MG/DL (ref 1.7–2.3)
MCH RBC QN AUTO: 30.9 PG (ref 26.5–33)
MCHC RBC AUTO-ENTMCNC: 33.1 G/DL (ref 31.5–36.5)
MCV RBC AUTO: 94 FL (ref 78–100)
NONHDLC SERPL-MCNC: 113 MG/DL
PLATELET # BLD AUTO: 223 10E3/UL (ref 150–450)
POTASSIUM SERPL-SCNC: 4.6 MMOL/L (ref 3.4–5.3)
PROT SERPL-MCNC: 7.1 G/DL (ref 6.4–8.3)
RBC # BLD AUTO: 4.17 10E6/UL (ref 3.8–5.2)
SODIUM SERPL-SCNC: 138 MMOL/L (ref 135–145)
TRIGL SERPL-MCNC: 84 MG/DL
WBC # BLD AUTO: 6.8 10E3/UL (ref 4–11)

## 2025-04-02 RX ORDER — ESCITALOPRAM OXALATE 10 MG/1
10 TABLET ORAL DAILY
Qty: 30 TABLET | Refills: 1 | Status: SHIPPED | OUTPATIENT
Start: 2025-04-02

## 2025-04-02 RX ORDER — TRAZODONE HYDROCHLORIDE 50 MG/1
25-50 TABLET ORAL
Qty: 90 TABLET | Refills: 1 | Status: SHIPPED | OUTPATIENT
Start: 2025-04-02

## 2025-04-02 ASSESSMENT — PATIENT HEALTH QUESTIONNAIRE - PHQ9
10. IF YOU CHECKED OFF ANY PROBLEMS, HOW DIFFICULT HAVE THESE PROBLEMS MADE IT FOR YOU TO DO YOUR WORK, TAKE CARE OF THINGS AT HOME, OR GET ALONG WITH OTHER PEOPLE: NOT DIFFICULT AT ALL
SUM OF ALL RESPONSES TO PHQ QUESTIONS 1-9: 6
SUM OF ALL RESPONSES TO PHQ QUESTIONS 1-9: 6

## 2025-04-02 NOTE — PATIENT INSTRUCTIONS
Patient Education   Preventive Care Advice   This is general advice given by our system to help you stay healthy. However, your care team may have specific advice just for you. Please talk to your care team about your preventive care needs.  Nutrition  Eat 5 or more servings of fruits and vegetables each day.  Try wheat bread, brown rice and whole grain pasta (instead of white bread, rice, and pasta).  Get enough calcium and vitamin D. Check the label on foods and aim for 100% of the RDA (recommended daily allowance).  Lifestyle  Exercise at least 150 minutes each week  (30 minutes a day, 5 days a week).  Do muscle strengthening activities 2 days a week. These help control your weight and prevent disease.  No smoking.  Wear sunscreen to prevent skin cancer.  Have a dental exam and cleaning every 6 months.  Yearly exams  See your health care team every year to talk about:  Any changes in your health.  Any medicines your care team has prescribed.  Preventive care, family planning, and ways to prevent chronic diseases.  Shots (vaccines)   HPV shots (up to age 26), if you've never had them before.  Hepatitis B shots (up to age 59), if you've never had them before.  COVID-19 shot: Get this shot when it's due.  Flu shot: Get a flu shot every year.  Tetanus shot: Get a tetanus shot every 10 years.  Pneumococcal, hepatitis A, and RSV shots: Ask your care team if you need these based on your risk.  Shingles shot (for age 50 and up)  General health tests  Diabetes screening:  Starting at age 35, Get screened for diabetes at least every 3 years.  If you are younger than age 35, ask your care team if you should be screened for diabetes.  Cholesterol test: At age 39, start having a cholesterol test every 5 years, or more often if advised.  Bone density scan (DEXA): At age 50, ask your care team if you should have this scan for osteoporosis (brittle bones).  Hepatitis C: Get tested at least once in your life.  STIs (sexually  transmitted infections)  Before age 24: Ask your care team if you should be screened for STIs.  After age 24: Get screened for STIs if you're at risk. You are at risk for STIs (including HIV) if:  You are sexually active with more than one person.  You don't use condoms every time.  You or a partner was diagnosed with a sexually transmitted infection.  If you are at risk for HIV, ask about PrEP medicine to prevent HIV.  Get tested for HIV at least once in your life, whether you are at risk for HIV or not.  Cancer screening tests  Cervical cancer screening: If you have a cervix, begin getting regular cervical cancer screening tests starting at age 21.  Breast cancer scan (mammogram): If you've ever had breasts, begin having regular mammograms starting at age 40. This is a scan to check for breast cancer.  Colon cancer screening: It is important to start screening for colon cancer at age 45.  Have a colonoscopy test every 10 years (or more often if you're at risk) Or, ask your provider about stool tests like a FIT test every year or Cologuard test every 3 years.  To learn more about your testing options, visit:   .  For help making a decision, visit:   https://bit.ly/ji80936.  Prostate cancer screening test: If you have a prostate, ask your care team if a prostate cancer screening test (PSA) at age 55 is right for you.  Lung cancer screening: If you are a current or former smoker ages 50 to 80, ask your care team if ongoing lung cancer screenings are right for you.  For informational purposes only. Not to replace the advice of your health care provider. Copyright   2023 [facility]. All rights reserved. Clinically reviewed by the [facility] Program. [employer] [id number] - REV 01/24.  Learning About Stress  What is stress?     Stress is your body's response to a hard situation. Your body can have a physical, emotional, or mental response. Stress is a fact of life for most people, and it  affects everyone differently. What causes stress for you may not be stressful for someone else.  A lot of things can cause stress. You may feel stress when you go on a job interview, take a test, or run a race. This kind of short-term stress is normal and even useful. It can help you if you need to work hard or react quickly. For example, stress can help you finish an important job on time.  Long-term stress is caused by ongoing stressful situations or events. Examples of long-term stress include long-term health problems, ongoing problems at work, or conflicts in your family. Long-term stress can harm your health.  How does stress affect your health?  When you are stressed, your body responds as though you are in danger. It makes hormones that speed up your heart, make you breathe faster, and give you a burst of energy. This is called the fight-or-flight stress response. If the stress is over quickly, your body goes back to normal and no harm is done.  But if stress happens too often or lasts too long, it can have bad effects. Long-term stress can make you more likely to get sick, and it can make symptoms of some diseases worse. If you tense up when you are stressed, you may develop neck, shoulder, or low back pain. Stress is linked to high blood pressure and heart disease.  Stress also harms your emotional health. It can make you orozco, tense, or depressed. Your relationships may suffer, and you may not do well at work or school.  What can you do to manage stress?  You can try these things to help manage stress:   Do something active. Exercise or activity can help reduce stress. Walking is a great way to get started. Even everyday activities such as housecleaning or yard work can help.  Try yoga or sirnea chi. These techniques combine exercise and meditation. You may need some training at first to learn them.  Do something you enjoy. For example, listen to music or go to a movie. Practice your hobby or do volunteer  "work.  Meditate. This can help you relax, because you are not worrying about what happened before or what may happen in the future.  Do guided imagery. Imagine yourself in any setting that helps you feel calm. You can use online videos, books, or a teacher to guide you.  Do breathing exercises. For example:  From a standing position, bend forward from the waist with your knees slightly bent. Let your arms dangle close to the floor.  Breathe in slowly and deeply as you return to a standing position. Roll up slowly and lift your head last.  Hold your breath for just a few seconds in the standing position.  Breathe out slowly and bend forward from the waist.  Let your feelings out. Talk, laugh, cry, and express anger when you need to. Talking with supportive friends or family, a counselor, or a myles leader about your feelings is a healthy way to relieve stress. Avoid discussing your feelings with people who make you feel worse.  Write. It may help to write about things that are bothering you. This helps you find out how much stress you feel and what is causing it. When you know this, you can find better ways to cope.  What can you do to prevent stress?  You might try some of these things to help prevent stress:  Manage your time. This helps you find time to do the things you want and need to do.  Get enough sleep. Your body recovers from the stresses of the day while you are sleeping.  Get support. Your family, friends, and community can make a difference in how you experience stress.  Limit your news feed. Avoid or limit time on social media or news that may make you feel stressed.  Do something active. Exercise or activity can help reduce stress. Walking is a great way to get started.  Where can you learn more?  Go to https://www.[employer].net/patiented  Enter N032 in the search box to learn more about \"Learning About Stress.\"  Current as of: October 24, 2024  Content Version: 14.4 2024-2025 [employer].   Care instructions adapted under license by your healthcare professional. If you have questions about a medical condition or this instruction, always ask your healthcare professional. [employer] disclaims any warranty or liability for your use of this information.    Learning About Depression Screening  What is depression screening?  Depression screening is a way to see if you have depression symptoms. It may be done by a doctor or counselor. It's often part of a routine checkup. That's because your mental health is just as important as your physical health.  Depression is a mental health condition that affects how you feel, think, and act. You may:  Have less energy.  Lose interest in your daily activities.  Feel sad and grouchy for a long time.  Depression is very common. It affects people of all ages.  Many things can lead to depression. Some people become depressed after they have a stroke or find out they have a major illness like cancer or heart disease. The death of a loved one or a breakup may lead to depression. It can run in families. Most experts believe that a combination of inherited genes and stressful life events can cause it.  What happens during screening?  You may be asked to fill out a form about your depression symptoms. You and the doctor will discuss your answers. The doctor may ask you more questions to learn more about how you think, act, and feel.  What happens after screening?  If you have symptoms of depression, your doctor will talk to you about your options.  Doctors usually treat depression with medicines or counseling. Often, combining the two works best. Many people don't get help because they think that they'll get over the depression on their own. But people with depression may not get better unless they get treatment.  The cause of depression is not well understood. There may be many factors involved. But if you have depression, it's not your fault.  A serious  "symptom of depression is thinking about death or suicide. If you or someone you care about talks about this or about feeling hopeless, get help right away.  It's important to know that depression can be treated. Medicine, counseling, and self-care may help.  Where can you learn more?  Go to https://www.[employer].net/patiented  Enter T185 in the search box to learn more about \"Learning About Depression Screening.\"  Current as of: July 31, 2024  Content Version: 14.4    6578-8741 [employer].   Care instructions adapted under license by your healthcare professional. If you have questions about a medical condition or this instruction, always ask your healthcare professional. [employer] disclaims any warranty or liability for your use of this information.       "

## 2025-04-02 NOTE — LETTER
April 3, 2025      Julia Garcia  [address]  [facility] [address]        Dear MsAnju,    We are writing to inform you of your test results.    Your test results fall within the expected range(s) or remain unchanged from previous results.  Please continue with current treatment plan.    Recommend nightly stretching prior to bed for foot cramps and continuing multivitamin.  No nutritional deficiencies including iron deficiency were seen on your blood work.    Resulted Orders   CBC with platelets   Result Value Ref Range    WBC Count 6.8 4.0 - 11.0 10e3/uL    RBC Count 4.17 3.80 - 5.20 10e6/uL    Hemoglobin 12.9 11.7 - 15.7 g/dL    Hematocrit 39.0 35.0 - 47.0 %    MCV 94 78 - 100 fL    MCH 30.9 26.5 - 33.0 pg    MCHC 33.1 31.5 - 36.5 g/dL    RDW 12.3 10.0 - 15.0 %    Platelet Count 223 150 - 450 10e3/uL   Comprehensive metabolic panel (BMP + Alb, Alk Phos, ALT, AST, Total. Bili, TP)   Result Value Ref Range    Sodium 138 135 - 145 mmol/L    Potassium 4.6 3.4 - 5.3 mmol/L    Carbon Dioxide (CO2) 28 22 - 29 mmol/L    Anion Gap 10 7 - 15 mmol/L    Urea Nitrogen 17.8 8.0 - 23.0 mg/dL    Creatinine 0.73 0.51 - 0.95 mg/dL    GFR Estimate >90 >60 mL/min/1.73m2      Comment:      eGFR calculated using 2021 CKD-EPI equation.    Calcium 9.1 8.8 - 10.4 mg/dL    Chloride 100 98 - 107 mmol/L    Glucose 85 70 - 99 mg/dL    Alkaline Phosphatase 77 40 - 150 U/L    AST 21 0 - 45 U/L    ALT 13 0 - 50 U/L    Protein Total 7.1 6.4 - 8.3 g/dL    Albumin 4.3 3.5 - 5.2 g/dL    Bilirubin Total 0.2 <=1.2 mg/dL    Patient Fasting > 8hrs? No    Lipid Profile (Chol, Trig, HDL, LDL calc)   Result Value Ref Range    Cholesterol 180 <200 mg/dL    Triglycerides 84 <150 mg/dL    Direct Measure HDL 67 >=50 mg/dL    LDL Cholesterol Calculated 96 <100 mg/dL    Non HDL Cholesterol 113 <130 mg/dL    Patient Fasting > 8hrs? No     Narrative    Cholesterol  Desirable: < 200 mg/dL  Borderline High: 200 - 239 mg/dL  High: >= 240  mg/dL    Triglycerides  Normal: < 150 mg/dL  Borderline High: 150 - 199 mg/dL  High: 200-499 mg/dL  Very High: >= 500 mg/dL    Direct Measure HDL  Female: >= 50 mg/dL   Male: >= 40 mg/dL    LDL Cholesterol  Desirable: < 100 mg/dL  Above Desirable: 100 - 129 mg/dL   Borderline High: 130 - 159 mg/dL   High:  160 - 189 mg/dL   Very High: >= 190 mg/dL    Non HDL Cholesterol  Desirable: < 130 mg/dL  Above Desirable: 130 - 159 mg/dL  Borderline High: 160 - 189 mg/dL  High: 190 - 219 mg/dL  Very High: >= 220 mg/dL   Ferritin   Result Value Ref Range    Ferritin 54 11 - 328 ng/mL   Magnesium   Result Value Ref Range    Magnesium 2.2 1.7 - 2.3 mg/dL       If you have any questions or concerns, please call the clinic at the number listed above.       Sincerely,      Laurita Chambers, DO    Electronically signed

## 2025-04-03 ASSESSMENT — ANXIETY QUESTIONNAIRES
IF YOU CHECKED OFF ANY PROBLEMS ON THIS QUESTIONNAIRE, HOW DIFFICULT HAVE THESE PROBLEMS MADE IT FOR YOU TO DO YOUR WORK, TAKE CARE OF THINGS AT HOME, OR GET ALONG WITH OTHER PEOPLE: SOMEWHAT DIFFICULT
7. FEELING AFRAID AS IF SOMETHING AWFUL MIGHT HAPPEN: NOT AT ALL
6. BECOMING EASILY ANNOYED OR IRRITABLE: NOT AT ALL
3. WORRYING TOO MUCH ABOUT DIFFERENT THINGS: SEVERAL DAYS
GAD7 TOTAL SCORE: 4
1. FEELING NERVOUS, ANXIOUS, OR ON EDGE: SEVERAL DAYS
GAD7 TOTAL SCORE: 4
5. BEING SO RESTLESS THAT IT IS HARD TO SIT STILL: NOT AT ALL
2. NOT BEING ABLE TO STOP OR CONTROL WORRYING: SEVERAL DAYS

## 2025-04-03 ASSESSMENT — PATIENT HEALTH QUESTIONNAIRE - PHQ9
5. POOR APPETITE OR OVEREATING: SEVERAL DAYS
SUM OF ALL RESPONSES TO PHQ QUESTIONS 1-9: 6

## 2025-05-30 DIAGNOSIS — F41.9 ANXIETY: ICD-10-CM

## 2025-06-04 RX ORDER — ESCITALOPRAM OXALATE 10 MG/1
10 TABLET ORAL DAILY
Qty: 90 TABLET | Refills: 2 | Status: SHIPPED | OUTPATIENT
Start: 2025-06-04

## 2025-06-04 NOTE — TELEPHONE ENCOUNTER
"Office Visit note by Dr. Chambers 4/2/25 states \"Increased mood symptoms since kids have moved out of the home and she is now living alone.  Suspect this is contributed to her weight loss and now she is interested in treatment with medication.  Discussed options and will begin Lexapro 10 mg daily.  I also prescribed trazodone as needed insomnia which has worked well for her in the past.  Follow-up in 1 month for dose adjustments.\"    Left message for patient requesting return call to clinic. If patient returns call, jeri review efficacy of Lexapro and route update to PCP.     KAREN Ray   [facility]   "

## 2025-06-04 NOTE — TELEPHONE ENCOUNTER
Patient returns call and states that she is feeling better since starting the medication. She states she feels better, has more energy and her friends have noticed a difference in her. She states her mood is stabilized and she feels she is on the right dose.     She is also taking Trazodone almost nightly and this has been effective for her sleep.